# Patient Record
(demographics unavailable — no encounter records)

---

## 2020-05-17 NOTE — EDM.PDOC
ED HPI GENERAL MEDICAL PROBLEM





- General


Chief Complaint: General


Stated Complaint: DIABETIC ISSURES


Time Seen by Provider: 05/17/20 07:33


Source of Information: Reports: Patient


History Limitations: Reports: No Limitations





- History of Present Illness


INITIAL COMMENTS - FREE TEXT/NARRATIVE: 





60-year-old male presents to the ED for evaluation of type 2 diabetes.  He 

states he is been out of his metformin for about 3 months.  Was taking 500 mg 

twice daily.  This primarily occurred because he could not afford the 

medication as he was laid off of work.  He feels fine.  He feels he has urinary 

frequency during the day but is not up much at night to go.  His weight is 

staying stable.  His only other problem is having some intermittent loose 

watery diarrhea once or twice per day.  He tends to drink bottled water.  He 

has been diabetic for approximately 3 years.  Is a strong family history of 

diabetes.


Onset: Other (Chronic problem)


Duration: Chronic


Location: Reports: Generalized (Mild weakness generalized)


Quality: Reports: Other


Severity: Mild


Improves with: Reports: None


Worsens with: Reports: None


Context: Denies: Activity, Exercise, Lifting, Sick Contact, Trauma, Other


Associated Symptoms: Reports: Malaise, Weakness.  Denies: No Other Symptoms, 

Confusion, Chest Pain, Cough, cough w sputum, Diaphoresis, Fever/Chills, 

Headaches, Loss of Appetite, Nausea/Vomiting, Rash, Seizure, Shortness of Breath

, Syncope


Treatments PTA: Reports: Other (see below)





- Related Data


 Allergies











Allergy/AdvReac Type Severity Reaction Status Date / Time


 


No Known Allergies Allergy   Verified 05/17/20 07:23











Home Meds: 


 Home Meds





Indomethacin  05/17/20 [History]


atorvaSTATin [Lipitor]  05/17/20 [History]


metFORMIN [Glucophage XR] 500 mg PO BIDMEALS #60 tab.er 05/17/20 [Rx]


metFORMIN [Glucophage]  05/17/20 [History]











Past Medical History


Cardiovascular History: Reports: High Cholesterol


Musculoskeletal History: Reports: Gout


Endocrine/Metabolic History: Reports: Diabetes, Type II (Early being treated 

with diet.  Was on metformin 3 months ago at 500 twice daily for diabetic 

control.)





Social & Family History





- Living Situation & Occupation


Occupation: Employed (travels  as he is an .)





ED ROS GENERAL





- Review of Systems


Review Of Systems: See Below


Constitutional: Reports: Malaise, Weakness, Fatigue.  Denies: Fever, Chills, 

Decreased Appetite


HEENT: Reports: No Symptoms


Respiratory: Reports: No Symptoms


Cardiovascular: Reports: No Symptoms


Endocrine: Reports: Fatigue


GI/Abdominal: Reports: Diarrhea (Sent to be on the looser side usually once or 

twice per day)


: Reports: Frequency, Other


Musculoskeletal: Reports: Joint Pain (Tour usually x2 patient no pain in his 

knees hips low back and neck.)


Skin: Reports: No Symptoms


Neurological: Reports: No Symptoms


Psychiatric: Reports: No Symptoms


Hematologic/Lymphatic: Reports: No Symptoms


Immunologic: Reports: No Symptoms





ED EXAM, GENERAL





- Physical Exam


Exam: See Below


Exam Limited By: No Limitations


General Appearance: Alert, WD/WN, No Apparent Distress, Other (Temperature is 

37.1 with a heart rate of 80.  /77 with respiratory to 18.  Pulse ox 100% 

on room air.)


Eye Exam: Bilateral Eye: Normal Inspection, PERRL


Throat/Mouth: Normal Inspection, Normal Lips, Normal Oropharynx, Other


Head: Atraumatic, Normocephalic


Neck: Normal Inspection, Supple, Non-Tender, Full Range of Motion.  No: 

Lymphadenopathy (L), Lymphadenopathy (R)


Respiratory/Chest: No Respiratory Distress, Lungs Clear, Normal Breath Sounds, 

No Accessory Muscle Use, Chest Non-Tender


Cardiovascular: Normal Peripheral Pulses, Regular Rate, Rhythm, No Edema, No 

Gallop, No Murmur, No Rub


Peripheral Pulses: 1+: Posterior Tibial (L), Posterior Tibial (R), Dorsalis 

Pedis (L), Dorsalis Pedis (R), 3+: Carotid (L), Carotid (R)


GI/Abdominal: Normal Bowel Sounds, Soft, Non-Tender, No Organomegaly, No 

Abnormal Bruit, No Mass, Pelvis Stable


 (Male) Exam: No Hernia


Back Exam: Normal Inspection, Full Range of Motion.  No: CVA Tenderness (L), 

CVA Tenderness (R)


Extremities: Normal Inspection, Normal Range of Motion, Non-Tender, No Pedal 

Edema


Neurological: Alert, Oriented, CN II-XII Intact, Normal Cognition, Normal Gait, 

No Motor/Sensory Deficits


Psychiatric: Normal Affect, Normal Mood


Skin Exam: Warm, Dry, Intact, Normal Color, No Rash





Course





- Vital Signs


Last Recorded V/S: 


 Last Vital Signs











Temp  37.1 C   05/17/20 07:19


 


Pulse  80   05/17/20 07:19


 


Resp  18   05/17/20 07:19


 


BP  117/77   05/17/20 07:19


 


Pulse Ox  100   05/17/20 07:19














- Orders/Labs/Meds


Labs: 


 Laboratory Tests











  05/17/20 05/17/20 05/17/20 Range/Units





  07:50 07:50 07:50 


 


WBC  7.04    (4.23-9.07)  K/mm3


 


RBC  4.55 L    (4.63-6.08)  M/mm3


 


Hgb  14.5    (13.7-17.5)  gm/dl


 


Hct  41.1    (40.1-51.0)  %


 


MCV  90.3    (79.0-92.2)  fl


 


MCH  31.9    (25.7-32.2)  pg


 


MCHC  35.3    (32.2-35.5)  g/dl


 


RDW Std Deviation  45.9 H    (35.1-43.9)  fL


 


Plt Count  202    (163-337)  K/mm3


 


MPV  9.6    (9.4-12.3)  fl


 


Neut % (Auto)  56.2    (34.0-67.9)  %


 


Lymph % (Auto)  28.8    (21.8-53.1)  %


 


Mono % (Auto)  11.9    (5.3-12.2)  %


 


Eos % (Auto)  2.6    (0.8-7.0)  


 


Baso % (Auto)  0.4    (0.1-1.2)  %


 


Neut # (Auto)  3.95    (1.78-5.38)  K/mm3


 


Lymph # (Auto)  2.03    (1.32-3.57)  K/mm3


 


Mono # (Auto)  0.84 H    (0.30-0.82)  K/mm3


 


Eos # (Auto)  0.18    (0.04-0.54)  K/mm3


 


Baso # (Auto)  0.03    (0.01-0.08)  K/mm3


 


Sodium   139   (136-145)  mEq/L


 


Potassium   3.6   (3.5-5.1)  mEq/L


 


Chloride   106   ()  mEq/L


 


Carbon Dioxide   21   (21-32)  mEq/L


 


Anion Gap   15.6 H   (5-15)  


 


BUN   19 H   (7-18)  mg/dL


 


Creatinine   0.9   (0.7-1.3)  mg/dL


 


Est Cr Clr Drug Dosing   90.12   mL/min


 


Estimated GFR (MDRD)   > 60   (>60)  mL/min


 


BUN/Creatinine Ratio   21.1 H   (14-18)  


 


Glucose   174 H   ()  mg/dL


 


Hemoglobin A1c     (4.50-6.20)  %


 


Calcium   8.3 L   (8.5-10.1)  mg/dL


 


Magnesium   1.7 L   (1.8-2.4)  mg/dl


 


Total Bilirubin   0.7   (0.2-1.0)  mg/dL


 


AST   20   (15-37)  U/L


 


ALT   25   (16-63)  U/L


 


Alkaline Phosphatase   75   ()  U/L


 


Total Protein   6.8   (6.4-8.2)  g/dl


 


Albumin   3.5   (3.4-5.0)  g/dl


 


Globulin   3.3   gm/dL


 


Albumin/Globulin Ratio   1.1   (1-2)  


 


Cholesterol   109   (<200)  mg/dL


 


LDL Cholesterol Direct   71   (<100)  mg/dL


 


HDL Cholesterol   30.0 L   (40-59)  mg/dL


 


Ketones    0.23  (0.0-0.3)  mM














  05/17/20 Range/Units





  07:50 


 


WBC   (4.23-9.07)  K/mm3


 


RBC   (4.63-6.08)  M/mm3


 


Hgb   (13.7-17.5)  gm/dl


 


Hct   (40.1-51.0)  %


 


MCV   (79.0-92.2)  fl


 


MCH   (25.7-32.2)  pg


 


MCHC   (32.2-35.5)  g/dl


 


RDW Std Deviation   (35.1-43.9)  fL


 


Plt Count   (163-337)  K/mm3


 


MPV   (9.4-12.3)  fl


 


Neut % (Auto)   (34.0-67.9)  %


 


Lymph % (Auto)   (21.8-53.1)  %


 


Mono % (Auto)   (5.3-12.2)  %


 


Eos % (Auto)   (0.8-7.0)  


 


Baso % (Auto)   (0.1-1.2)  %


 


Neut # (Auto)   (1.78-5.38)  K/mm3


 


Lymph # (Auto)   (1.32-3.57)  K/mm3


 


Mono # (Auto)   (0.30-0.82)  K/mm3


 


Eos # (Auto)   (0.04-0.54)  K/mm3


 


Baso # (Auto)   (0.01-0.08)  K/mm3


 


Sodium   (136-145)  mEq/L


 


Potassium   (3.5-5.1)  mEq/L


 


Chloride   ()  mEq/L


 


Carbon Dioxide   (21-32)  mEq/L


 


Anion Gap   (5-15)  


 


BUN   (7-18)  mg/dL


 


Creatinine   (0.7-1.3)  mg/dL


 


Est Cr Clr Drug Dosing   mL/min


 


Estimated GFR (MDRD)   (>60)  mL/min


 


BUN/Creatinine Ratio   (14-18)  


 


Glucose   ()  mg/dL


 


Hemoglobin A1c  7.40 H  (4.50-6.20)  %


 


Calcium   (8.5-10.1)  mg/dL


 


Magnesium   (1.8-2.4)  mg/dl


 


Total Bilirubin   (0.2-1.0)  mg/dL


 


AST   (15-37)  U/L


 


ALT   (16-63)  U/L


 


Alkaline Phosphatase   ()  U/L


 


Total Protein   (6.4-8.2)  g/dl


 


Albumin   (3.4-5.0)  g/dl


 


Globulin   gm/dL


 


Albumin/Globulin Ratio   (1-2)  


 


Cholesterol   (<200)  mg/dL


 


LDL Cholesterol Direct   (<100)  mg/dL


 


HDL Cholesterol   (40-59)  mg/dL


 


Ketones   (0.0-0.3)  mM














- Radiology Interpretation


Free Text/Narrative:: 


60-year-old male presents to the ED for evaluation of fatigue.  He has a known 

history of type 2 diabetes controlled with metformin but has been off of this 

medication for about 3 months due to lack of funds.  He is feeling increasingly 

fatigued and is wondering if his blood sugars are out of control.  He does not 

have lancets or testing materials to check his sugars.  If his weight is 

stable.  He is may be up once during the night to void.  He does not take water 

to bed.  He does drink a lot of water throughout the day.  I can smell no 

ketones on his breath.  His only other complaint is some intermittent diarrhea 

i.e. loose and watery once or twice per day out any blood.  Plan routine labs 

to be performed including a glycosylated protein.  He is essentially fasting at 

this time.  His only other problem is hypercholesterolemia which will be 

checked today as well.








- Re-Assessments/Exams


Free Text/Narrative Re-Assessment/Exam: 





05/17/20 08:24 Hematology is back revealing a normal white count at 7.04.  The 

auto differential shows 56% neutrophils.  Hemoglobin is 14.5 with hematocrit of 

41.1.  Platelet count 202,000





05/17/20 08:36 Chemistry reveals a sodium of 139 potassium 3.6.  Chloride 106 

with a bicarb of 21.  Anion gap is 15.6.  BUN is 19 with a creatinine of 0.9.  

GFR is greater than 60.  Glucose 174 with a hemoglobin A1c of 7.40.  Calcium is 

8.3 with a magnesium of 1.7.  Liver function is otherwise normal.  Total 

protein 6.8 with an albumin fraction of 3.5.  Cholesterol is 109 with an LDL 

cholesterol of 71 and HDL cholesterol of 30.  Ketones are 0.23 .  Blood sugars 

overall are very well controlled.  I suspect that chronic loose stools is 

secondary to medicines being used for sinus disease especially with mucolytic  

agents.  Plan I will return him to metformin 500 mg twice daily for diabetic 

control.  He is used this in the past with little problems.  I also wrote a 

prescription for lancets and for strips for the One Touch machine so that he 

can test his blood sugars on a random fashion.  He wishes to follow-up locally 

and I suggested following up with Dr. Michael.














Departure





- Departure


Time of Disposition: 08:49


Disposition: Home, Self-Care 01


Condition: Fair


Clinical Impression: 


Type 2 diabetes mellitus


Qualifiers:


 Diabetes mellitus long term insulin use: without long term use Diabetes 

mellitus complication status: without complication Qualified Code(s): E11.9 - 

Type 2 diabetes mellitus without complications








- Discharge Information


*PRESCRIPTION DRUG MONITORING PROGRAM REVIEWED*: Not Applicable


*COPY OF PRESCRIPTION DRUG MONITORING REPORT IN PATIENT LEENA: Not Applicable


Prescriptions: 


metFORMIN [Glucophage XR] 500 mg PO BIDMEALS #60 tab.er


Instructions:  Type 2 Diabetes Mellitus, Diagnosis, Adult, Tips for Eating Away 

From Home If You Have Diabetes, Type 2 Diabetes Mellitus, Self Care, Adult


Referrals: 


PCP,None [Primary Care Provider] - 


Forms:  ED Department Discharge


Additional Instructions: 


Evaluation in the emergency room today in regards to known type 2 diabetes 

mellitus without treatment for the last 3 months due to financial barriers to 

purchasing medications.  Test today revealed your fasting blood sugar was 

elevated at 174 confirming that you do have type 2 diabetes.  The glycosylated 

protein which looks at your blood sugars over the last 3 months was actually 

pretty darn good at 7.4.  We aim for a treatment level of under 7 with just 

means the diabetes is fairly well controlled.  Cholesterol also proved to be 

very well controlled at present.  I suspect the chronic loose stools are 

secondary to medicines being used for sinus congestion.  Suggest going off of 

these for about a week to see if the stools do not form up.  Dust resuming 

metformin 500 mg twice daily for blood sugar control.  Written in this regard.  

Scripts were also written for lancets and for the strips for the One Touch 

machine so that you can test your blood sugars on a random fashion as we 

discussed and write them down in a notebook which is helpful to the doctor.  

Just follow-up with Dr.brian Baez -who is an internist on [facility]  Please phone [phone] to arrange an 

appointment.





Sepsis Event Note





- Evaluation


Sepsis Screening Result: No Definite Risk





- Focused Exam


Vital Signs: 


 Vital Signs











  Temp Pulse Resp BP Pulse Ox


 


 05/17/20 07:19  37.1 C  80  18  117/77  100











Date Exam was Performed: 05/17/20


Time Exam was Performed: 08:53